# Patient Record
Sex: FEMALE | Race: WHITE | URBAN - METROPOLITAN AREA
[De-identification: names, ages, dates, MRNs, and addresses within clinical notes are randomized per-mention and may not be internally consistent; named-entity substitution may affect disease eponyms.]

---

## 2019-04-16 ENCOUNTER — OFFICE VISIT (OUTPATIENT)
Dept: ORTHOPEDIC SURGERY | Age: 39
End: 2019-04-16
Payer: COMMERCIAL

## 2019-04-16 VITALS
DIASTOLIC BLOOD PRESSURE: 85 MMHG | WEIGHT: 130 LBS | HEIGHT: 64 IN | HEART RATE: 69 BPM | BODY MASS INDEX: 22.2 KG/M2 | SYSTOLIC BLOOD PRESSURE: 121 MMHG

## 2019-04-16 DIAGNOSIS — M25.512 LEFT SHOULDER PAIN, UNSPECIFIED CHRONICITY: Primary | ICD-10-CM

## 2019-04-16 DIAGNOSIS — M89.8X5 PAIN IN RIGHT FEMUR: ICD-10-CM

## 2019-04-16 PROCEDURE — 99204 OFFICE O/P NEW MOD 45 MIN: CPT | Performed by: ORTHOPAEDIC SURGERY

## 2019-04-16 SDOH — HEALTH STABILITY: MENTAL HEALTH: HOW OFTEN DO YOU HAVE A DRINK CONTAINING ALCOHOL?: NOT ASKED

## 2019-04-16 ASSESSMENT — ENCOUNTER SYMPTOMS: ROS SKIN COMMENTS: SKIN CONDITION/CANCER

## 2019-04-16 NOTE — PROGRESS NOTES
Aching  Duration of Pain: Persistent  Frequency of Pain: Constant  Aggravating Factors: (sitting, driving)  Limiting Behavior: Yes  Relieving Factors: (nothing)  Result of Injury: No  Work-Related Injury: No  Are there other pain locations you wish to document?: No    Pertinent items are noted in HPI  Review of systems reviewed from Patient History Form dated on 4/16/2019   and available in the patient's chart under the Media tab. Vital Signs:  Vitals:    04/16/19 1357   BP: 121/85   Pulse: 69         Neuro: Alert & oriented x 3,  normal,  no focal deficits noted. Normal affect. Eyes: sclera clear  Ears: Normal external ear  Mouth:  No perioral lesions  Pulm: Respirations unlabored and regular  Pulse: Regular rate and rhythm   Skin: Warm, well perfused      Constitutional: The physical examination finds the patient to be well-developed and well-nourished. The patient is alert and oriented x3 and was cooperative throughout the visit. Neuro: alert and oriented  Eyes: Sclera clear  Ears: Normal external ear  Mouth: No perioral lesions  Pulm: Respirations unlabored and regular  Skin: Warm, well perfused        Left  Shoulder Examination:    Inspection:  Held in a normal posture. Normal contour at the acromioclavicular joint. No swelling, ecchymosis, or erythema about the shoulder. No atrophy appreciated. Palpation:  There is a catch within the subacromial space that is tender     Range of Motion: Full passive and active ROM. Normal scapulothoracic rhythm. Positive Drop arm test.     Strength:  4/5 supraspinatus, 4+/5 infraspinatus, and 5/5 subscapularis muscle strength. Stability: No anterior instability. No posterior instability. Special Tests:  Crossover sign is negative. Belly press sign is negative. Lift off sign is negative. Impingement findings are Positive . Labral findings are negative. Speed sign and Yergason signs are both positive. Other findings: The skin is warm dry and well perfused. inflammation as well    AP and lateral of the right femur demonstrates an acute fracture or dislocation. No appreciable arthrosis within the visible lumbar spine or sacroiliac joint. Impression:  rheumatoid appearing female exam.  Normal-appearing shoulder exam.  MRI consistent with biceps tendinopathy as well as superior cuff tendinosis         Assessment :   Suspected chronic rotator cuff tear involving the left shoulder supraspinatus tendon. Suspected fibromyalgia regarding the right lower extremity. Impression:  Encounter Diagnoses   Name Primary?  Left shoulder pain, unspecified chronicity Yes    Pain in right femur        Office Procedures:  Orders Placed This Encounter   Procedures    XR SHOULDER LEFT (MIN 2 VIEWS)     Standing Status:   Future     Number of Occurrences:   1     Standing Expiration Date:   4/16/2020     Order Specific Question:   Reason for exam:     Answer:   pain    XR FEMUR RIGHT (MIN 2 VIEWS)     Standing Status:   Future     Number of Occurrences:   1     Standing Expiration Date:   4/16/2020     Order Specific Question:   Reason for exam:     Answer:   pain           Plan:  at present we recommend repeating the MRI of her left shoulder for suspected rotator cuff tear which we will leave would require surgical fixation given her age and required functional demands. We do not believe a corticosteroid injection is indicated at this time unless we can rule out a tear. Regarding the right lower extremity given the extensive and exhaustive physical therapy that she is undergone as well as her multiple trigger points of tenderness we believe that she may need further workup for suspected fibromyalgia admin medical management therein. Piriformis syndrome has resolved. We will refer her back to her physical medicine rehabilitation doctor for further workup on this. She can follow-up with us for her MRI review. Archie Parham is in agreement with this plan.  All questions were answered to patient's satisfaction and was encouraged to call with any further questions. Janell Cuellar MD  Fellow  [facility]  Date: 4/16/2019    This dictation was performed with a verbal recognition program [facility]) and it was checked for errors. It is possible that there are still dictated errors within this office note. If so, please bring any errors to my attention for an addendum. All efforts were made to ensure that this office note is accurate. I supervised my sports medicine fellow in the evaluation and development of a treatment plan  for this patient. I personally interviewed the patient and performed a physical examination. In addition, I discussed the patient's condition and treatment options with them. All of their questions were answered. I personally reviewed the patient's pain scale, review of systems, family history, social history, past medical history, allergies and medications. 13 point review of systems was collected today and is filed in the medical record. Miguelina Cam MD  Sports Medicine, Arthroscopic Knee and Shoulder Surgery    This dictation was performed with a verbal recognition program [facility]) and it was checked for errors. It is possible that there are still dictated errors within this office note. If so, please bring any errors to my attention for an addendum. All efforts were made to ensure that this office note is accurate.

## 2019-04-23 ENCOUNTER — OFFICE VISIT (OUTPATIENT)
Dept: ORTHOPEDIC SURGERY | Age: 39
End: 2019-04-23
Payer: COMMERCIAL

## 2019-04-23 VITALS
HEART RATE: 91 BPM | BODY MASS INDEX: 22.2 KG/M2 | SYSTOLIC BLOOD PRESSURE: 107 MMHG | WEIGHT: 130 LBS | DIASTOLIC BLOOD PRESSURE: 69 MMHG | HEIGHT: 64 IN

## 2019-04-23 DIAGNOSIS — M75.42 IMPINGEMENT SYNDROME OF LEFT SHOULDER: ICD-10-CM

## 2019-04-23 DIAGNOSIS — M19.012 ARTHRITIS OF LEFT ACROMIOCLAVICULAR JOINT: ICD-10-CM

## 2019-04-23 DIAGNOSIS — M75.82 ROTATOR CUFF TENDINITIS, LEFT: Primary | ICD-10-CM

## 2019-04-23 PROCEDURE — 99213 OFFICE O/P EST LOW 20 MIN: CPT | Performed by: ORTHOPAEDIC SURGERY

## 2019-04-23 NOTE — PROGRESS NOTES
History of Present Illness:  Kip Carreon is a 37 y.o. female here today for follow up evaluation of the left shoulder. She was sent for an MRI for concern of a rotator cuff tear. She states her pain and symptoms are largely unchanged from previous. Patient continues to complain of catching and weakness in the left shoulder. The pain continues to bother her at night, especially since she is a side sleeper. She denies any new injuries. Patient also wishes to further discuss her right thigh pain. This pain has been present for 1 year. She states it constantly aches. Patient describes feeling the need to massage the area. Her pain affects her seated position as well as her standing position. She must shift her weight to her left leg and extremity due to discomfort. She describes it as feeling tight. Patient denies any new injuries. Pain Assessment  Location of Pain: Shoulder  Location Modifiers: Left  Severity of Pain: 5  Quality of Pain: Aching(tired)  Duration of Pain: Persistent  Frequency of Pain: Constant  Aggravating Factors: (raising arm above head or behind back)  Limiting Behavior: Yes  Result of Injury: No  Work-Related Injury: No  Are there other pain locations you wish to document?: No    No past medical history on file. No past surgical history on file. No family history on file.     Social History     Socioeconomic History    Marital status:      Spouse name: None    Number of children: None    Years of education: None    Highest education level: None   Occupational History    None   Social Needs    Financial resource strain: None    Food insecurity:     Worry: None     Inability: None    Transportation needs:     Medical: None     Non-medical: None   Tobacco Use    Smoking status: Never Smoker    Smokeless tobacco: Never Used   Substance and Sexual Activity    Alcohol use: Not Currently    Drug use: Never    Sexual activity: None   Lifestyle    Physical activity: Days per week: None     Minutes per session: None    Stress: None   Relationships    Social connections:     Talks on phone: None     Gets together: None     Attends Holiness service: None     Active member of club or organization: None     Attends meetings of clubs or organizations: None     Relationship status: None    Intimate partner violence:     Fear of current or ex partner: None     Emotionally abused: None     Physically abused: None     Forced sexual activity: None   Other Topics Concern    None   Social History Narrative    None       No current outpatient medications on file. No current facility-administered medications for this visit. No Known Allergies    Vital signs:  /69   Pulse 91   Ht 5' 4\" (1.626 m)   Wt 130 lb (59 kg)   BMI 22.31 kg/m²        Neuro: Alert & oriented x 3,  normal,  no focal deficits noted. Normal affect. Eyes: sclera clear  Ears: Normal external ear  Mouth:  No perioral lesions  Pulm: Respirations unlabored and regular  Pulse: Regular rate and rhythm   Skin: Warm, well perfused      Constitutional: The physical examination finds the patient to be well-developed and well-nourished. The patient is alert and oriented x3 and was cooperative throughout the visit. LEFT Shoulder Examination:    Inspection:  Held in a normal posture. Normal contour at the acromioclavicular joint. No abnormal swelling, ecchymosis or erythema about the shoulder. No induration. No atrophy appreciated. Palpation: Tenderness of the greater tuberosity. Acromioclavicular joint: Nontender to palpation. Range of Motion: Normal scapulothoracic rhythm. Mild limitation of internal rotation. Strength: Normal infraspinatus and subscapularis muscle strength. Mild supraspinatus muscle weakness secondary to pain. Instability: No anterior or posterior subluxation/instability. Additional Tests: Crossover sign is negative. Belly press sign is negative.  Lift off sign is negative. Labral findings are negative. Speed sign and Yergason signs are both negative. Positive impingement findings. Vascular:       Skin warm well perfused. Neurologic:     Sensation intact to light touch. Comparison RIGHT Shoulder Examination:    Inspection:  Held in a normal posture. Normal contour at the acromioclavicular joint. No swelling, ecchymosis, or erythema about the shoulder. No atrophy appreciated. Palpation:  No subacromial crepitus. Range of Motion: Full passive and active ROM. Normal scapulothoracic rhythm. Strength:  Normal supraspinatus, infraspinatus, and subscapularis muscle strength. Stability: No anterior instability. No posterior instability. Special Tests:  Crossover sign is negative. Belly press sign is negative. Lift off sign is negative. Impingement findings are negative. Labral findings are negative. Speed sign and Yergason signs are both negative. Vascular: The skin is warm dry and well perfused. Neurologic: Distally neurovascularly intact. Sensation is intact to light touch over the deltoid. RIGHT Hip Examination:    Gait: Normal     Skin: There are no rashes, ulcerations or lesions.     Inspection:  No erythema swelling or signs of infection. Leg lengths symmetric. No evidence of hip flexion contracture.     Palpation: No tenderness over greater trochanter. Nontender over the gluteus medius. .  No palpable masses.     Range of Motion: Full pain-free range of motion.     Strength:  5/5 hip flexion and abduction and adduction. Appears symmetric.     Stability: No subluxation. Vascular: Skin warm dry and well perfused. No calf tenderness.     Neurologic: No focal motor deficits. Sensation intact. Special Tests:  Negative Trendelenburg sign.       LEFT Hip Comparison Examination:    Gait: Normal     Skin: There are no rashes, ulcerations or lesions.     Inspection:  No erythema swelling or signs of infection. Leg lengths symmetric.  No evidence of hip flexion contracture.     Palpation: No tenderness over greater trochanter. Nontender over the gluteus medius. .  No palpable masses.     Range of Motion: Full pain-free range of motion.     Strength:  5/5 hip flexion and abduction and adduction. Appears symmetric.     Stability: No subluxation. Vascular: Skin warm dry and well perfused. No calf tenderness.     Neurologic: No focal motor deficits. Sensation intact. Special Tests:  Negative Trendelenburg sign. Radiology:     MRI images of the left shoulder were reviewed today. Radiographs of the right femur dated for 4/16/2019 were re-reviewed today. Impression:      CONCLUSION:   1. Mild rotator cuff peritendinitis and tendinosis without macrotear.  This is similar to    prior. 2. Mild acromioclavicular arthropathy, improved. 3. Mild labral fraying and glenohumeral capsulitis, stable. Assessment :  Rotator cuff tendinitis, impingement and arthritis, left shoulder. Impression:  Encounter Diagnoses   Name Primary?  Rotator cuff tendinitis, left Yes    Impingement syndrome of left shoulder     Arthritis of left acromioclavicular joint        Office Procedures:  No orders of the defined types were placed in this encounter. Plan: Pertinent imaging was reviewed. The etiology, natural history, and treatment options for the disorder were discussed. The roles of activity medication, antiinflammatories, injections, bracing, physical therapy, and surgical interventions were all described to the patient and questions were answered. Based on her symptoms and examination, I believe she is amenable to a cortisone injection today. Patient expresses interest in proceeding with this entity today. I will also get her set up with formal, supervised physical therapy as well. We have also discussed the use of an ice machine as needed.      In regards to her right thigh pain, I have strongly suggested she keep her

## 2019-04-23 NOTE — PROGRESS NOTES
Review of Systems   Musculoskeletal: Positive for arthralgias. All other systems reviewed and are negative.

## 2019-04-29 ENCOUNTER — HOSPITAL ENCOUNTER (OUTPATIENT)
Dept: PHYSICAL THERAPY | Age: 39
Setting detail: THERAPIES SERIES
Discharge: HOME | End: 2019-04-29
Payer: COMMERCIAL

## 2019-04-29 PROCEDURE — 97161 PT EVAL LOW COMPLEX 20 MIN: CPT | Performed by: PHYSICAL THERAPIST

## 2019-04-29 PROCEDURE — 97110 THERAPEUTIC EXERCISES: CPT | Performed by: PHYSICAL THERAPIST

## 2019-04-29 NOTE — PLAN OF CARE
The [address] [place] [address] and [address]  Phone [phone]  Fax [phone]      Physical Therapy Certification    Dear Referring Practitioner: Dr. Codey Vincent ,    We had the pleasure of evaluating the following patient for physical therapy services at [address] [place] [address]. A summary of our findings can be found in the initial assessment below. This includes our plan of care. If you have any questions or concerns regarding these findings, please do not hesitate to contact me at the office phone number checked above. Thank you for the referral.       Physician Signature:_______________________________Date:__________________  By signing above (or electronic signature), therapists plan is approved by physician      Patient: Clara Sims   : 1980   MRN: [id number]  Referring Physician: Referring Practitioner: Dr. Codey Vincent       Evaluation Date: 2019      Medical Diagnosis Information:  Diagnosis: M75.82 (ICD-10-CM) - Rotator cuff tendinitis, left   Treatment Diagnosis: M25.512 Left shoulder pain                                          Insurance information: PT Insurance Information: [employer]     Precautions/ Contra-indications:   Latex Allergy:  [x]NO      []YES  Preferred Language for Healthcare:   [x]English       []other:    SUBJECTIVE: Patient stated complaint: Pt report she has a 1.5 year history of left shoulder pain. Denies any injury but states pain has progressively gotten worse. She did get cortisone injections in the past which seemed to help and she did do some physical therapy. She got an injection Tuesday but it did not seem to help. Imaging: Xray, MRI   CONCLUSION:   1. Mild rotator cuff peritendinitis and tendinosis without macrotear.  This is similar to    prior. 2. Mild acromioclavicular arthropathy, improved.    3. Mild labral fraying and glenohumeral capsulitis, stable. Dominant arm: right handed       Relevant Medical History: no significant medical history   Functional Disability Index:  UEFS: 35/80 - 56.25% deficit     Pain Scale: 4-6/10  Easing factors: nothing seems to help (cortisone injections previously)   Provocative factors: sleeping, folding laundry, pushing away dishes, drying hair, fastening bra      Type: [x]Constant   []Intermittent  []Radiating []Localized []other:     Numbness/Tingling: denies neurological symptoms     Occupation/School: Teaches      Living Status/Prior Level of Function: Independent with ADLs and IADLs, Pilates     OBJECTIVE:     ROM PROM AROM  Comment    L R L - p!  R    Flexion 140  WNL    Abduction 135  WNL    ER 90 @ 90 deg abd  100 @ 90 deg abd       IR 60 @ 90 deg abd  WNL T7 T3    Other(cervical)        Other             Strength L R Comment   Flexion 4- 4-    Abduction 4- 4-    ER 3+ 4-    IR 3+ 4-    Supraspinatus 3 4-    Upper Trap      Lower Trap      Mid Trap      Rhomboids      Biceps 4 4+    Triceps 4+ 4+    Horizontal Abduction      Horizontal Adduction      Lats        Special Tests Results/Comment   [place] +   Neers +   Speeds -   [place] -   Neurologic Signs -           Reflexes/Sensation:               [x]Dermatomes/Myotomes intact               []Reflexes equal and normal bilaterally               []Other:     Joint mobility:                 [x]Normal               []Hypo              []Hyper     Palpation: TTP on greater tuberosity, upper trap, and posterior shoulder       Functional Mobility/Transfers: WNL      Posture: rounded shoulders      Bandages/Dressings/Incisions: n/a      Gait: (include devices/WB status): WNL      Orthopedic Special Tests:                        [x] Patient history, allergies, meds reviewed. Medical chart reviewed. See intake form.       Review Of Systems (ROS):  [x]Performed Review of systems (Integumentary, CardioPulmonary, Neurological) by intake and observation. Intake form has been scanned into medical record. Patient has been instructed to contact their primary care physician regarding ROS issues if not already being addressed at this time. Co-morbidities/Complexities (which will affect course of rehabilitation):   [x]None              Arthritic conditions   []Rheumatoid arthritis (M05.9)  []Osteoarthritis (M19.91)    Cardiovascular conditions   []Hypertension (I10)  []Hyperlipidemia (E78.5)  []Angina pectoris (I20)  []Atherosclerosis (I70)    Musculoskeletal conditions   []Disc pathology   []Congenital spine pathologies   []Prior surgical intervention  []Osteoporosis (M81.8)  []Osteopenia (M85.8)   Endocrine conditions   []Hypothyroid (E03.9)  []Hyperthyroid Gastrointestinal conditions   []Constipation (S34.27)    Metabolic conditions   []Morbid obesity (E66.01)  []Diabetes type 1(E10.65) or 2 (E11.65)   []Neuropathy (G60.9)      Pulmonary conditions   []Asthma (J45)  []Coughing   []COPD (J44.9)    Psychological Disorders  []Anxiety (F41.9)  []Depression (F32.9)   []Other:    []Other:            Barriers to/and or personal factors that will affect rehab potential:              []Age  []Sex              []Motivation/Lack of Motivation                        []Co-Morbidities              []Cognitive Function, education/learning barriers              []Environmental, home barriers              []profession/work barriers  []past PT/medical experience  []other:  Justification:      Falls Risk Assessment (30 days):   [x] Falls Risk assessed and no intervention required.   [] Falls Risk assessed and Patient requires intervention due to being higher risk   TUG score (>12s at risk):     [] Falls education provided, including      G-Codes:    UEFS: 35/80 - 56.25% deficit        ASSESSMENT:   Functional Impairments              []Noted spinal or UE joint hypomobility              []Noted spinal or UE joint hypermobility              [x]Decreased UE functional []Signs/symptoms consistent with Glenohumeral IR Deficit - <45 degrees              []Signs/symptoms consistent with facet dysfunction of cervical/thoracic spine                         []Signs/symptoms consistent with pathology which may benefit from Dry needling                []other:      Prognosis/Rehab Potential:                                       []Excellent              [x]Good                 []Fair              []Poor     Tolerance of evaluation/treatment:               []Excellent              []Good                 [x]Fair+ (increased pain and soreness after assessment)               []Poor     Physical Therapy Evaluation Complexity Justification  [x] A history of present problem with:  [x] no personal factors and/or comorbidities that impact the plan of care;  []1-2 personal factors and/or comorbidities that impact the plan of care  []3 personal factors and/or comorbidities that impact the plan of care  [x] An examination of body systems using standardized tests and measures addressing any of the following: body structures and functions (impairments), activity limitations, and/or participation restrictions;:  [] a total of 1-2 or more elements   [] a total of 3 or more elements   [x] a total of 4 or more elements   [x] A clinical presentation with:  [x] stable and/or uncomplicated characteristics   [] evolving clinical presentation with changing characteristics  [] unstable and unpredictable characteristics;   [x] Clinical decision making of [x] low, [] moderate, [] high complexity using standardized patient assessment instrument and/or measurable assessment of functional outcome.      [x] EVAL (LOW) 99987 (typically 20 minutes face-to-face)  [] EVAL (MOD) 74434 (typically 30 minutes face-to-face)  [] EVAL (HIGH) 59295 (typically 45 minutes face-to-face)  [] RE-EVAL         PLAN:  Frequency/Duration:  2 days per week for 4 Weeks:  INTERVENTIONS:  [x] Therapeutic exercise including: strength training, ROM, for Upper extremity and core   [x]  NMR activation and proprioception for UE, scap and Core   [x] Manual therapy as indicated for shoulder, scapula and spine to include: Dry Needling/IASTM, STM, PROM, Gr I-IV mobilizations, manipulation. [x] Modalities as needed that may include: thermal agents, E-stim, Biofeedback, US, iontophoresis as indicated  [x] Patient education on joint protection, postural re-education, activity modification, progression of HEP. GOALS:  Patient stated goal: Relieve shoulder pain     Therapist goals for Patient:   Short Term Goals: To be achieved in: 2 weeks  1. Independent in HEP and progression per patient tolerance, in order to prevent re-injury. 2. Patient will have a decrease in pain to facilitate improvement in movement, function, and ADLs as indicated by Functional Deficits. Long Term Goals: To be achieved in: 6-8 weeks  1. Disability index score of 20% or less for the UEFS to assist with reaching prior level of function. 2. Patient will demonstrate increased AROM to WNL to allow for proper joint functioning as indicated by patients Functional Deficits. 3. Patient will demonstrate an increase in Strength to good scapular and core control  for UE to allow for proper functional mobility as indicated by patients Functional Deficits. 4. Patient will return to daily functional activities without increased symptoms or restriction. 5. Patient will be able to participate in Pilates without increased symptoms or restriction.       Electronically signed by:  Eddie Reyna PT

## 2019-04-29 NOTE — FLOWSHEET NOTE
[address] [facility], [facility] [email or web address] [address], [address] [place] [address]  Phone: [phone]   Fax:     [phone]    Physical Therapy Daily Treatment Note  Date:  2019    Patient Name:  Nano Alva    :  1980  MRN: [id number]  Restrictions/Precautions:    Medical/Treatment Diagnosis Information:  · Diagnosis: M75.82 (ICD-10-CM) - Rotator cuff tendinitis, left  · Treatment Diagnosis: M25.512 Left shoulder pain   Insurance/Certification information:  PT Insurance Information: [place]   Physician Information:  Referring Practitioner: Dr. Laxmi Pedesren of care signed (Y/N):     Date of Patient follow up with Physician:     G-Code (if applicable):      Date G-Code Applied:    UEFS: 35/80 - 56.25% deficit     Progress Note: [x]  Yes  []  No  Next due by: Visit #10      Latex Allergy:  [x]NO      []YES  Preferred Language for Healthcare:   [x]English       []other:    Visit # Insurance Allowable   1 30     Pain level:  4-6/10     SUBJECTIVE:  See eval    OBJECTIVE: See eval  Observation:   Test measurements:      RESTRICTIONS/PRECAUTIONS:     Exercises/Interventions:     Exercise/Equipment Resistance/Repetitions Other comments   Stretching/PROM     Wand     Table Slides 10x10\" flex, abd    UE [place]     Pulleys     Pendulum          Isometrics     Retraction          Weight shift     Flexion     Abduction     External Rotation     Internal Rotation     Biceps     Triceps          PRE's     Flexion     Abduction     External Rotation NPV     Internal Rotation     Shrugs 10x     Scap retraction w/ shoulder ext NPV     Reverse Flys     Serratus NPV     Horizontal Abd with ER     Biceps     Triceps     Retraction          Cable Column/Theraband     External Rotation     Internal Rotation     Shrugs     Lats     Ext     Flex     Scapular Retraction 10x    BIC     TRIC     PNF          Dynamic Stability          Plyoback Manual interventions                     Therapeutic Exercise and NMR EXR  [x] (63075) Provided verbal/tactile cueing for activities related to strengthening, flexibility, endurance, ROM  for improvements in scapular, scapulothoracic and UE control with self care, reaching, carrying, lifting, house/yardwork, driving/computer work.    [] (93908) Provided verbal/tactile cueing for activities related to improving balance, coordination, kinesthetic sense, posture, motor skill, proprioception  to assist with  scapular, scapulothoracic and UE control with self care, reaching, carrying, lifting, house/yardwork, driving/computer work. Therapeutic Activities:    [] (81558 or 03573) Provided verbal/tactile cueing for activities related to improving balance, coordination, kinesthetic sense, posture, motor skill, proprioception and motor activation to allow for proper function of scapular, scapulothoracic and UE control with self care, carrying, lifting, driving/computer work.      Home Exercise Program:    [x] (08932) Reviewed/Progressed HEP activities related to strengthening, flexibility, endurance, ROM of scapular, scapulothoracic and UE control with self care, reaching, carrying, lifting, house/yardwork, driving/computer work  [] (47297) Reviewed/Progressed HEP activities related to improving balance, coordination, kinesthetic sense, posture, motor skill, proprioception of scapular, scapulothoracic and UE control with self care, reaching, carrying, lifting, house/yardwork, driving/computer work      Manual Treatments:  PROM / STM / Oscillations-Mobs:  G-I, II, III, IV (PA's, Inf., Post.)  [] (33354) Provided manual therapy to mobilize soft tissue/joints of cervical/CT, scapular GHJ and UE for the purpose of modulating pain, promoting relaxation,  increasing ROM, reducing/eliminating soft tissue swelling/inflammation/restriction, improving soft tissue extensibility and allowing for proper ROM for normal function with self care, reaching, carrying, lifting, house/yardwork, driving/computer work    Modalities:  Ice 10'     Charges:  Timed Code Treatment Minutes: 15   Total Treatment Minutes: 55       [x] EVAL (LOW) 29291 (typically 20 minutes face-to-face)  [] EVAL (MOD) 99512 (typically 30 minutes face-to-face)  [] EVAL (HIGH) 73141 (typically 45 minutes face-to-face)  [] RE-EVAL     [x] CP(58715) x  1   [] IONTO  [] NMR (09585) x      [] VASO  [] Manual (15758) x       [] Other:  [] TA x       [] Mech Traction (70412)  [] ES(attended) (38100)      [] ES (un) (35945):     GOALS:  Patient stated goal: Relieve shoulder pain     Therapist goals for Patient:   Short Term Goals: To be achieved in: 2 weeks  1. Independent in HEP and progression per patient tolerance, in order to prevent re-injury. 2. Patient will have a decrease in pain to facilitate improvement in movement, function, and ADLs as indicated by Functional Deficits. Long Term Goals: To be achieved in: 6-8 weeks  1. Disability index score of 20% or less for the UEFS to assist with reaching prior level of function. 2. Patient will demonstrate increased AROM to WNL to allow for proper joint functioning as indicated by patients Functional Deficits. 3. Patient will demonstrate an increase in Strength to good scapular and core control  for UE to allow for proper functional mobility as indicated by patients Functional Deficits. 4. Patient will return to daily functional activities without increased symptoms or restriction. 5. Patient will be able to participate in Pilates without increased symptoms or restriction. Progression Towards Functional goals:  [] Patient is progressing as expected towards functional goals listed. [] Progression is slowed due to complexities listed. [] Progression has been slowed due to co-morbidities.   [x] Plan just implemented, too soon to assess goals progression  [] Other:     ASSESSMENT:  See eval    Treatment/Activity Tolerance:  [x] Patient tolerated treatment fair+ [] Patient limited by fatique  [] Patient limited by pain  [] Patient limited by other medical complications  [] Other:     Patient education:  4/29 Patient education on PT and plan of care including diagnosis, prognosis, treatment goals and options. Patient agrees with discussed POC and treatment and is aware of rehab process. Pt was also educated on clinic layout and use of modalities. Prognosis: [] Good [] Fair  [] Poor    Patient Requires Follow-up: [x] Yes  [] No    PLAN: See eval  [] Continue per plan of care [] Alter current plan (see comments)  [x] Plan of care initiated [] Hold pending MD visit [] Discharge    Electronically signed by: Lazarus Jean PT, DPT  *If patient does not return for further follow ups after this date. Please consider this as the patients discharge from physical therapy.

## 2019-05-03 ENCOUNTER — HOSPITAL ENCOUNTER (OUTPATIENT)
Dept: PHYSICAL THERAPY | Age: 39
Setting detail: THERAPIES SERIES
Discharge: HOME | End: 2019-05-03
Payer: COMMERCIAL

## 2019-05-03 PROCEDURE — 97112 NEUROMUSCULAR REEDUCATION: CPT | Performed by: PHYSICAL THERAPIST

## 2019-05-03 PROCEDURE — 97110 THERAPEUTIC EXERCISES: CPT | Performed by: PHYSICAL THERAPIST

## 2019-05-03 NOTE — FLOWSHEET NOTE
The [facility] and [facility], [address] [place] [address] [address] [facility], [address] [place] [address]  Phone: [phone]   Fax:     [phone]    Physical Therapy Daily Treatment Note  Date:  5/3/2019    Patient Name:  Latrice Baez    :  1980  MRN: [id number]  Restrictions/Precautions:    Medical/Treatment Diagnosis Information:  · Diagnosis: M75.82 (ICD-10-CM) - Rotator cuff tendinitis, left  · Treatment Diagnosis: M25.512 Left shoulder pain   Insurance/Certification information:  PT Insurance Information: [place]   Physician Information:  Referring Practitioner: Dr. Magui Haque of care signed (Y/N):     Date of Patient follow up with Physician:     G-Code (if applicable):      Date G-Code Applied:    UEFS: 35/80 - 56.25% deficit     Progress Note: [x]  Yes  []  No  Next due by: Visit #10      Latex Allergy:  [x]NO      []YES  Preferred Language for Healthcare:   [x]English       []other:    Visit # Insurance Allowable   2 30     Pain level:  3/10 (5/3)     SUBJECTIVE:  Pt reports she was pretty sore after her initial visit.   5/3    OBJECTIVE: See eval  Observation:   Test measurements:      ROM  PROM AROM  Comment     L R L - p!  R     Flexion 140  WNL     Abduction 135  WNL     ER 90 @ 90 deg abd  100 @ 90 deg abd          IR 60 @ 90 deg abd  WNL T7 T3     Other(cervical)             Other                    Strength  L R Comment   Flexion 4- 4-     Abduction 4- 4-     ER 3+ 4-     IR 3+ 4-     Supraspinatus 3 4-     Upper Trap         Lower Trap         Mid Trap         Rhomboids         Biceps 4 4+     Triceps 4+ 4+     Horizontal Abduction         Horizontal Adduction         Lats                RESTRICTIONS/PRECAUTIONS:     Exercises/Interventions:     Exercise/Equipment Resistance/Repetitions Other comments   Stretching/PROM     Wand     Table Slides 10x10\" flex, abd    UE [place]     Pulleys 10x10\" flex, abd  Added 5/3   Pendulum Isometrics     Retraction          Weight shift     Flexion     Abduction     External Rotation     Internal Rotation     Biceps     Triceps     Scapular clocks (side lying)  -4 directions 10x    Tactile and Verbal cues (randomized order) Added 5/3        PRE's     Flexion     Abduction     External Rotation 2x10  Added 5/3   Internal Rotation     Shrugs 10x     Scap retraction w/ shoulder ext 2x10 Added 5/3   Reverse Flys     Serratus 2x10  Added 5/3   Horizontal Abd with ER     Biceps     Triceps     Retraction          Cable Column/Theraband     External Rotation     Internal Rotation     Shrugs     Lats     Ext     Flex     Scapular Retraction 10x    BIC     TRIC     PNF          Dynamic Stability          Plyoback          Manual interventions                     Therapeutic Exercise and NMR EXR  [x] (22852) Provided verbal/tactile cueing for activities related to strengthening, flexibility, endurance, ROM  for improvements in scapular, scapulothoracic and UE control with self care, reaching, carrying, lifting, house/yardwork, driving/computer work.    [] (17153) Provided verbal/tactile cueing for activities related to improving balance, coordination, kinesthetic sense, posture, motor skill, proprioception  to assist with  scapular, scapulothoracic and UE control with self care, reaching, carrying, lifting, house/yardwork, driving/computer work. Therapeutic Activities:    [] (50185 or 02057) Provided verbal/tactile cueing for activities related to improving balance, coordination, kinesthetic sense, posture, motor skill, proprioception and motor activation to allow for proper function of scapular, scapulothoracic and UE control with self care, carrying, lifting, driving/computer work.      Home Exercise Program:    [x] (87996) Reviewed/Progressed HEP activities related to strengthening, flexibility, endurance, ROM of scapular, scapulothoracic and UE control with self care, reaching, carrying, lifting, house/yardwork, driving/computer work  [] (23573) Reviewed/Progressed HEP activities related to improving balance, coordination, kinesthetic sense, posture, motor skill, proprioception of scapular, scapulothoracic and UE control with self care, reaching, carrying, lifting, house/yardwork, driving/computer work      Manual Treatments:  PROM / STM / Oscillations-Mobs:  G-I, II, III, IV (PA's, Inf., Post.)  [] ([id number]) Provided manual therapy to mobilize soft tissue/joints of cervical/CT, scapular GHJ and UE for the purpose of modulating pain, promoting relaxation,  increasing ROM, reducing/eliminating soft tissue swelling/inflammation/restriction, improving soft tissue extensibility and allowing for proper ROM for normal function with self care, reaching, carrying, lifting, house/yardwork, driving/computer work    Modalities:  Ice 10'     Charges:  Timed Code Treatment Minutes: 40   Total Treatment Minutes: 9099-1404       [] EVAL (LOW) 24596 (typically 20 minutes face-to-face)  [] EVAL (MOD) 73099 (typically 30 minutes face-to-face)  [] EVAL (HIGH) 16266 (typically 45 minutes face-to-face)  [] RE-EVAL     [x] WM(31435) x  2   [] IONTO  [x] NMR (63121) x  1   [] VASO  [] Manual ([id number]) x       [] Other:  [] TA x       [] Mech Traction (71778)  [] ES(attended) (19153)      [] ES (un) (14543):     GOALS:  Patient stated goal: Relieve shoulder pain     Therapist goals for Patient:   Short Term Goals: To be achieved in: 2 weeks  1. Independent in HEP and progression per patient tolerance, in order to prevent re-injury. 2. Patient will have a decrease in pain to facilitate improvement in movement, function, and ADLs as indicated by Functional Deficits. Long Term Goals: To be achieved in: 6-8 weeks  1. Disability index score of 20% or less for the UEFS to assist with reaching prior level of function.    2. Patient will demonstrate increased AROM to WNL to allow for proper joint functioning as indicated by

## 2019-05-06 ENCOUNTER — HOSPITAL ENCOUNTER (OUTPATIENT)
Dept: PHYSICAL THERAPY | Age: 39
Setting detail: THERAPIES SERIES
Discharge: HOME | End: 2019-05-06
Payer: COMMERCIAL

## 2019-05-06 PROCEDURE — 97110 THERAPEUTIC EXERCISES: CPT | Performed by: PHYSICAL THERAPIST

## 2019-05-06 PROCEDURE — 97112 NEUROMUSCULAR REEDUCATION: CPT | Performed by: PHYSICAL THERAPIST

## 2019-05-06 NOTE — FLOWSHEET NOTE
The [facility] and [facility], [facility] [address] [facility], [address] [place] [address]  Phone: [phone]   Fax:     [phone]    Physical Therapy Daily Treatment Note  Date:  2019    Patient Name:  Nicky Holden    :  1980  MRN: [id number]  Restrictions/Precautions:    Medical/Treatment Diagnosis Information:  · Diagnosis: M75.82 (ICD-10-CM) - Rotator cuff tendinitis, left  · Treatment Diagnosis: M25.512 Left shoulder pain   Insurance/Certification information:  PT Insurance Information: [place]   Physician Information:  Referring Practitioner: Dr. Wero Davis of care signed (Y/N):     Date of Patient follow up with Physician:     G-Code (if applicable):      Date G-Code Applied:    UEFS: 35/80 - 56.25% deficit     Progress Note: [x]  Yes  []  No  Next due by: Visit #10      Latex Allergy:  [x]NO      []YES  Preferred Language for Healthcare:   [x]English       []other:    Visit # Insurance Allowable   3 30     Pain level:  2/10 ()     SUBJECTIVE:  Shoulder is feeling better.       OBJECTIVE: See eval  Observation:   Test measurements:      ROM  PROM AROM  Comment     L R L - p!  R     Flexion 140  WNL     Abduction 135  WNL     ER 90 @ 90 deg abd  100 @ 90 deg abd          IR 60 @ 90 deg abd  WNL T7 T3     Other(cervical)             Other                    Strength  L R Comment   Flexion 4- 4-     Abduction 4- 4-     ER 3+ 4-     IR 3+ 4-     Supraspinatus 3 4-     Upper Trap         Lower Trap         Mid Trap         Rhomboids         Biceps 4 4+     Triceps 4+ 4+     Horizontal Abduction         Horizontal Adduction         Lats                RESTRICTIONS/PRECAUTIONS:     Exercises/Interventions:     Exercise/Equipment Resistance/Repetitions Other comments   Stretching/PROM     Wand     Table Slides 10x10\" flex, abd    UE [place]     Pulleys 10x10\" flex, abd  Added 5/3   UT stretch  3x20\"  Added  Isometrics     Retraction          Weight shift     Flexion     Abduction     External Rotation     Internal Rotation     Biceps     Triceps     Scapular clocks (side lying)  -4 directions 10x    Tactile and Verbal cues (randomized order) Added 5/3        PRE's     Flexion     Abduction     External Rotation 2x10  Added 5/3   Internal Rotation     Shrugs 2x10  ^ 5/6   Scap retraction w/ shoulder ext 2x10 Added 5/3   Reverse Flys     Serratus 2x10  Added 5/3   Horizontal Abd with ER     Biceps     Triceps     Retraction          Cable Column/Theraband     External Rotation     Internal Rotation     Shrugs     Lats     Ext     Flex     Scapular Retraction 2x10  ^ 5/6   BIC     TRIC     No money 2x10 yellow  Added 5/6        Dynamic Stability          Plyoback          Manual interventions                     Therapeutic Exercise and NMR EXR  [x] (13903) Provided verbal/tactile cueing for activities related to strengthening, flexibility, endurance, ROM  for improvements in scapular, scapulothoracic and UE control with self care, reaching, carrying, lifting, house/yardwork, driving/computer work.    [] (26536) Provided verbal/tactile cueing for activities related to improving balance, coordination, kinesthetic sense, posture, motor skill, proprioception  to assist with  scapular, scapulothoracic and UE control with self care, reaching, carrying, lifting, house/yardwork, driving/computer work. Therapeutic Activities:    [] (53567 or 50670) Provided verbal/tactile cueing for activities related to improving balance, coordination, kinesthetic sense, posture, motor skill, proprioception and motor activation to allow for proper function of scapular, scapulothoracic and UE control with self care, carrying, lifting, driving/computer work.      Home Exercise Program:    [x] (54904) Reviewed/Progressed HEP activities related to strengthening, flexibility, endurance, ROM of scapular, scapulothoracic and UE control with self care, reaching, carrying, lifting, house/yardwork, driving/computer work  [] (95998) Reviewed/Progressed HEP activities related to improving balance, coordination, kinesthetic sense, posture, motor skill, proprioception of scapular, scapulothoracic and UE control with self care, reaching, carrying, lifting, house/yardwork, driving/computer work      Manual Treatments:  PROM / STM / Oscillations-Mobs:  G-I, II, III, IV (PA's, Inf., Post.)  [] ([id number]) Provided manual therapy to mobilize soft tissue/joints of cervical/CT, scapular GHJ and UE for the purpose of modulating pain, promoting relaxation,  increasing ROM, reducing/eliminating soft tissue swelling/inflammation/restriction, improving soft tissue extensibility and allowing for proper ROM for normal function with self care, reaching, carrying, lifting, house/yardwork, driving/computer work    Modalities: Ice 5'     Charges:  Timed Code Treatment Minutes: 38   Total Treatment Minutes: 173-910       [] EVAL (LOW) 72899 (typically 20 minutes face-to-face)  [] EVAL (MOD) 11023 (typically 30 minutes face-to-face)  [] EVAL (HIGH) 17063 (typically 45 minutes face-to-face)  [] RE-EVAL     [x] AD(46673) x  2   [] IONTO  [x] NMR (59808) x  1   [] VASO  [] Manual (20021) x       [] Other:  [] TA x       [] Mech Traction (75721)  [] ES(attended) (04659)      [] ES (un) (79628):     GOALS:  Patient stated goal: Relieve shoulder pain     Therapist goals for Patient:   Short Term Goals: To be achieved in: 2 weeks  1. Independent in HEP and progression per patient tolerance, in order to prevent re-injury. 2. Patient will have a decrease in pain to facilitate improvement in movement, function, and ADLs as indicated by Functional Deficits. Long Term Goals: To be achieved in: 6-8 weeks  1. Disability index score of 20% or less for the UEFS to assist with reaching prior level of function.    2. Patient will demonstrate increased AROM to WNL to allow for proper joint functioning as indicated by patients Functional Deficits. 3. Patient will demonstrate an increase in Strength to good scapular and core control  for UE to allow for proper functional mobility as indicated by patients Functional Deficits. 4. Patient will return to daily functional activities without increased symptoms or restriction. 5. Patient will be able to participate in Pilates without increased symptoms or restriction. Progression Towards Functional goals:  [] Patient is progressing as expected towards functional goals listed. [] Progression is slowed due to complexities listed. [] Progression has been slowed due to co-morbidities. [x] Plan just implemented, too soon to assess goals progression  [] Other:     ASSESSMENT:  See eval    Treatment/Activity Tolerance:  [x] Patient tolerated treatment well [] Patient limited by fatique  [] Patient limited by pain  [] Patient limited by other medical complications  [x] Other: still fatigued with current program - tactile and verbal cues needed to decrease upper trap activation 5/6    Patient education:  4/29 Patient education on PT and plan of care including diagnosis, prognosis, treatment goals and options. Patient agrees with discussed POC and treatment and is aware of rehab process. Pt was also educated on clinic layout and use of modalities. Prognosis: [x] Good [] Fair  [] Poor    Patient Requires Follow-up: [x] Yes  [] No    PLAN: See eval  [x] Continue per plan of care [] Alter current plan (see comments)  [] Plan of care initiated [] Hold pending MD visit [] Discharge    Electronically signed by: Armand Smith PT, DPT  *If patient does not return for further follow ups after this date. Please consider this as the patients discharge from physical therapy.

## 2019-05-09 ENCOUNTER — APPOINTMENT (OUTPATIENT)
Dept: PHYSICAL THERAPY | Age: 39
End: 2019-05-09
Payer: COMMERCIAL

## 2019-05-10 ENCOUNTER — HOSPITAL ENCOUNTER (OUTPATIENT)
Dept: PHYSICAL THERAPY | Age: 39
Setting detail: THERAPIES SERIES
Discharge: HOME | End: 2019-05-10
Payer: COMMERCIAL

## 2019-05-10 PROCEDURE — 97112 NEUROMUSCULAR REEDUCATION: CPT | Performed by: PHYSICAL THERAPIST

## 2019-05-10 PROCEDURE — 97110 THERAPEUTIC EXERCISES: CPT | Performed by: PHYSICAL THERAPIST

## 2019-05-10 NOTE — FLOWSHEET NOTE
The [address] [facility] and [address] [facility], [facility] [address] [facility], [address] [facility]  Phone: [phone]   Fax:     [phone]    Physical Therapy Daily Treatment Note  Date:  5/10/2019    Patient Name:  Shirley Sykes    :  1980  MRN: [id number]  Restrictions/Precautions:    Medical/Treatment Diagnosis Information:  · Diagnosis: M75.82 (ICD-10-CM) - Rotator cuff tendinitis, left  · Treatment Diagnosis: M25.512 Left shoulder pain   Insurance/Certification information:  PT Insurance Information: [place]   Physician Information:  Referring Practitioner: Dr. Danie Rubio of care signed (Y/N):     Date of Patient follow up with Physician:     G-Code (if applicable):      Date G-Code Applied:    UEFS: 35/80 - 56.25% deficit     Progress Note: [x]  Yes  []  No  Next due by: Visit #10      Latex Allergy:  [x]NO      []YES  Preferred Language for Healthcare:   [x]English       []other:    Visit # Insurance Allowable   4  5/10 30     Pain level:  2/10 ()     SUBJECTIVE:  No new issues.  5/10    OBJECTIVE: See eval  Observation:   Test measurements:              ROM  PROM AROM  Comment     L R L - p!  R     Flexion 140  WNL     Abduction 135  WNL     ER 90 @ 90 deg abd  100 @ 90 deg abd          IR 60 @ 90 deg abd  WNL T7 T3     Other(cervical)             Other                    Strength  L R Comment   Flexion 4- 4-     Abduction 4- 4-     ER 3+ 4-     IR 3+ 4-     Supraspinatus 3 4-     Upper Trap         Lower Trap         Mid Trap         Rhomboids         Biceps 4 4+     Triceps 4+ 4+     Horizontal Abduction         Horizontal Adduction         Lats                RESTRICTIONS/PRECAUTIONS:     Exercises/Interventions:     Exercise/Equipment Resistance/Repetitions Other comments   Stretching/PROM     Wand     Table Slides 10x10\" flex, abd    UE [place]     Pulleys 10x10\" flex, abd  Added 5/3   UT stretch  3x20\"  Added  Isometrics     Retraction          Weight shift     Flexion     Abduction     External Rotation     Internal Rotation     Biceps     Triceps     Scapular clocks (side lying)  -4 directions 10x    Tactile and Verbal cues (randomized order) Added 5/3        PRE's     Flexion     Abduction     External Rotation Internal Rotation Shrugs Scap retraction w/ shoulder ext Reverse Flys Serratus Horizontal Abd with ER Biceps 2x10 0#  Added 5/10   Triceps     Retraction          Cable Column/Theraband     External Rotation 2x5 red Added 5/10   Internal Rotation 2x10 red Added 5/10   Shrugs     Lats     Ext     Flex     Scapular Retraction 2x10 red ^ 5/10   BIC     TRIC 3x10 red Added 5/10   No money      Dynamic Stability          [place]ack          Manual interventions                     Therapeutic Exercise and NMR EXR  [x] (06275) Provided verbal/tactile cueing for activities related to strengthening, flexibility, endurance, ROM  for improvements in scapular, scapulothoracic and UE control with self care, reaching, carrying, lifting, house/yardwork, driving/computer work.    [] (19984) Provided verbal/tactile cueing for activities related to improving balance, coordination, kinesthetic sense, posture, motor skill, proprioception  to assist with  scapular, scapulothoracic and UE control with self care, reaching, carrying, lifting, house/yardwork, driving/computer work. Therapeutic Activities:    [] (68082 or 08337) Provided verbal/tactile cueing for activities related to improving balance, coordination, kinesthetic sense, posture, motor skill, proprioception and motor activation to allow for proper function of scapular, scapulothoracic and UE control with self care, carrying, lifting, driving/computer work.      Home Exercise Program:    [x] (82308) Reviewed/Progressed HEP activities related to strengthening, flexibility, endurance, ROM of scapular, scapulothoracic and UE control with self care, reaching, carrying, lifting, house/yardwork, driving/computer work  [] (75378) Reviewed/Progressed HEP activities related to improving balance, coordination, kinesthetic sense, posture, motor skill, proprioception of scapular, scapulothoracic and UE control with self care, reaching, carrying, lifting, house/yardwork, driving/computer work      Manual Treatments:  PROM / STM / Oscillations-Mobs:  G-I, II, III, IV (PA's, Inf., Post.)  [] ([id number]) Provided manual therapy to mobilize soft tissue/joints of cervical/CT, scapular GHJ and UE for the purpose of modulating pain, promoting relaxation,  increasing ROM, reducing/eliminating soft tissue swelling/inflammation/restriction, improving soft tissue extensibility and allowing for proper ROM for normal function with self care, reaching, carrying, lifting, house/yardwork, driving/computer work    Modalities:  Declined ice     Charges:  Timed Code Treatment Minutes: 30   Total Treatment Minutes: 158-234       [] EVAL (LOW) 72473 (typically 20 minutes face-to-face)  [] EVAL (MOD) 04936 (typically 30 minutes face-to-face)  [] EVAL (HIGH) 45295 (typically 45 minutes face-to-face)  [] RE-EVAL     [x] HB(78735) x  1   [] IONTO  [x] NMR (34196) x  1   [] VASO  [] Manual ([id number]) x       [] Other:  [] TA x       [] Mech Traction (44299)  [] ES(attended) (66168)      [] ES (un) (91179):     GOALS:  Patient stated goal: Relieve shoulder pain     Therapist goals for Patient:   Short Term Goals: To be achieved in: 2 weeks  1. Independent in HEP and progression per patient tolerance, in order to prevent re-injury. 2. Patient will have a decrease in pain to facilitate improvement in movement, function, and ADLs as indicated by Functional Deficits. Long Term Goals: To be achieved in: 6-8 weeks  1. Disability index score of 20% or less for the UEFS to assist with reaching prior level of function.    2. Patient will demonstrate increased AROM to WNL to allow for proper joint functioning as indicated by patients Functional Deficits. 3. Patient will demonstrate an increase in Strength to good scapular and core control  for UE to allow for proper functional mobility as indicated by patients Functional Deficits. 4. Patient will return to daily functional activities without increased symptoms or restriction. 5. Patient will be able to participate in Pilates without increased symptoms or restriction. Progression Towards Functional goals:  [] Patient is progressing as expected towards functional goals listed. [] Progression is slowed due to complexities listed. [] Progression has been slowed due to co-morbidities. [x] Plan just implemented, too soon to assess goals progression  [] Other:     ASSESSMENT:  See eval    Treatment/Activity Tolerance:  [x] Patient tolerated treatment well [] Patient limited by fatique  [] Patient limited by pain  [] Patient limited by other medical complications  [x] Other: Pt was challenged by progression with theraband exercises, but she denies any increased pain/symptoms. Discussed with pt to continue with her current routine at home and try to increase to 3 sets of each. 5/10        Patient education:  4/29 Patient education on PT and plan of care including diagnosis, prognosis, treatment goals and options. Patient agrees with discussed POC and treatment and is aware of rehab process. Pt was also educated on clinic layout and use of modalities. Prognosis: [x] Good [] Fair  [] Poor    Patient Requires Follow-up: [x] Yes  [] No    PLAN: See eval  [x] Continue per plan of care [] Alter current plan (see comments)  [] Plan of care initiated [] Hold pending MD visit [] Discharge    Electronically signed by: Stephany Matson PT, DPT  *If patient does not return for further follow ups after this date. Please consider this as the patients discharge from physical therapy.

## 2019-05-14 ENCOUNTER — APPOINTMENT (OUTPATIENT)
Dept: PHYSICAL THERAPY | Age: 39
End: 2019-05-14
Payer: COMMERCIAL

## 2019-05-16 ENCOUNTER — HOSPITAL ENCOUNTER (OUTPATIENT)
Dept: PHYSICAL THERAPY | Age: 39
Setting detail: THERAPIES SERIES
Discharge: HOME | End: 2019-05-16
Payer: COMMERCIAL

## 2019-05-16 PROCEDURE — 97110 THERAPEUTIC EXERCISES: CPT | Performed by: PHYSICAL THERAPIST

## 2019-05-16 PROCEDURE — 97112 NEUROMUSCULAR REEDUCATION: CPT | Performed by: PHYSICAL THERAPIST

## 2019-05-16 NOTE — FLOWSHEET NOTE
The [address] [facility] and [address] [facility], [facility] [address] [facility], [address] [place] [address]  Phone: [phone]   Fax:     [phone]    Physical Therapy Daily Treatment Note  Date:  2019    Patient Name:  Sudhir Castanon    :  1980  MRN: [id number]  Restrictions/Precautions:    Medical/Treatment Diagnosis Information:  · Diagnosis: M75.82 (ICD-10-CM) - Rotator cuff tendinitis, left  · Treatment Diagnosis: M25.512 Left shoulder pain   Insurance/Certification information:  PT Insurance Information: [place]   Physician Information:  Referring Practitioner: Dr. Oj Peña of care signed (Y/N):     Date of Patient follow up with Physician:     G-Code (if applicable):      Date G-Code Applied:    UEFS: 35/80 - 56.25% deficit     Progress Note: [x]  Yes  []  No  Next due by: Visit #10      Latex Allergy:  [x]NO      []YES  Preferred Language for Healthcare:   [x]English       []other:    Visit # Insurance Allowable    30     Pain level:  4/10 ()     SUBJECTIVE:  Pt reports her shoulder is feeling better.  She did have to put a lot of chairs away after a school event earlier today - increased soreness     OBJECTIVE: See eval  Observation:   Test measurements:              ROM  PROM AROM  Comment     L R L - p!  R     Flexion 140  WNL     Abduction 135  WNL     ER 90 @ 90 deg abd  100 @ 90 deg abd          IR 60 @ 90 deg abd  WNL T7 T3     Other(cervical)             Other                    Strength  L R Comment   Flexion 4- 4-     Abduction 4- 4-     ER 3+ 4-     IR 3+ 4-     Supraspinatus 3 4-     Upper Trap         Lower Trap         Mid Trap         Rhomboids         Biceps 4 4+     Triceps 4+ 4+     Horizontal Abduction         Horizontal Adduction         Lats                RESTRICTIONS/PRECAUTIONS:     Exercises/Interventions:     Exercise/Equipment Resistance/Repetitions Other comments   Stretching/PROM Wand     Table Slides 10x10\" flex, abd    UE [place]     Pulleys 10x10\" flex, abd  Added 5/3   UT stretch  3x20\"  Added 5/6        Isometrics     Retraction          Weight shift     Flexion     Abduction     External Rotation     Internal Rotation     Biceps     Triceps     Scapular clocks (side lying)  -4 directions 10x    Tactile and Verbal cues (randomized order) Added 5/3        PRE's     Flexion     Abduction     External Rotation 2x10  Added 5/3   Internal Rotation     Shrugs 2x10  ^ 5/6   Scap retraction  2x10 Added 5/3   Reverse Flys Serratus Horizontal Abd with ER Biceps 2x10 0#  Added 5/10   Triceps     Retraction          Cable Column/Theraband     External Rotation 2x5 red Added 5/10   Internal Rotation 2x10 red Added 5/10   Shrugs     Lats     Ext     Flex     Scapular Retraction 2x10 red ^ 5/10   BIC     TRIC 3x10 red Added 5/10   No money      Dynamic Stability          [place]          Manual interventions                     Therapeutic Exercise and NMR EXR  [x] (72706) Provided verbal/tactile cueing for activities related to strengthening, flexibility, endurance, ROM  for improvements in scapular, scapulothoracic and UE control with self care, reaching, carrying, lifting, house/yardwork, driving/computer work.    [] (63925) Provided verbal/tactile cueing for activities related to improving balance, coordination, kinesthetic sense, posture, motor skill, proprioception  to assist with  scapular, scapulothoracic and UE control with self care, reaching, carrying, lifting, house/yardwork, driving/computer work. Therapeutic Activities:    [] (30315 or 62578) Provided verbal/tactile cueing for activities related to improving balance, coordination, kinesthetic sense, posture, motor skill, proprioception and motor activation to allow for proper function of scapular, scapulothoracic and UE control with self care, carrying, lifting, driving/computer work.      Home Exercise Program:    [x] (38821) Reviewed/Progressed HEP activities related to strengthening, flexibility, endurance, ROM of scapular, scapulothoracic and UE control with self care, reaching, carrying, lifting, house/yardwork, driving/computer work  [] (97697) Reviewed/Progressed HEP activities related to improving balance, coordination, kinesthetic sense, posture, motor skill, proprioception of scapular, scapulothoracic and UE control with self care, reaching, carrying, lifting, house/yardwork, driving/computer work      Manual Treatments:  PROM / STM / Oscillations-Mobs:  G-I, II, III, IV (PA's, Inf., Post.)  [] (06871) Provided manual therapy to mobilize soft tissue/joints of cervical/CT, scapular GHJ and UE for the purpose of modulating pain, promoting relaxation,  increasing ROM, reducing/eliminating soft tissue swelling/inflammation/restriction, improving soft tissue extensibility and allowing for proper ROM for normal function with self care, reaching, carrying, lifting, house/yardwork, driving/computer work    Modalities:  Declined ice     Charges:  Timed Code Treatment Minutes: 25   Total Treatment Minutes: 200-235       [] EVAL (LOW) 18053 (typically 20 minutes face-to-face)  [] EVAL (MOD) 15940 (typically 30 minutes face-to-face)  [] EVAL (HIGH) 96524 (typically 45 minutes face-to-face)  [] RE-EVAL     [x] SY(43770) x  1   [] IONTO  [x] NMR (44688) x  1   [] VASO  [] Manual (20223) x       [] Other:  [] TA x       [] Mech Traction (85732)  [] ES(attended) (06561)      [] ES (un) (14370):     GOALS:  Patient stated goal: Relieve shoulder pain     Therapist goals for Patient:   Short Term Goals: To be achieved in: 2 weeks  1. Independent in HEP and progression per patient tolerance, in order to prevent re-injury. 2. Patient will have a decrease in pain to facilitate improvement in movement, function, and ADLs as indicated by Functional Deficits. Long Term Goals: To be achieved in: 6-8 weeks  1.  Disability index score of 20% or less

## 2023-09-27 NOTE — PROGRESS NOTES
Review of Systems   Musculoskeletal: Positive for arthralgias. Skin:        Skin condition/cancer   Neurological:        Chronic pain    All other systems reviewed and are negative. Oral Minoxidil Pregnancy And Lactation Text: This medication should only be used when clearly needed if you are pregnant, attempting to become pregnant or breast feeding.